# Patient Record
Sex: FEMALE | Race: OTHER | HISPANIC OR LATINO
[De-identification: names, ages, dates, MRNs, and addresses within clinical notes are randomized per-mention and may not be internally consistent; named-entity substitution may affect disease eponyms.]

---

## 2017-02-27 ENCOUNTER — APPOINTMENT (OUTPATIENT)
Dept: OBGYN | Facility: CLINIC | Age: 37
End: 2017-02-27

## 2017-03-13 ENCOUNTER — MEDICATION RENEWAL (OUTPATIENT)
Age: 37
End: 2017-03-13

## 2017-10-26 ENCOUNTER — TRANSCRIPTION ENCOUNTER (OUTPATIENT)
Age: 37
End: 2017-10-26

## 2017-11-22 ENCOUNTER — APPOINTMENT (OUTPATIENT)
Dept: OBGYN | Facility: CLINIC | Age: 37
End: 2017-11-22
Payer: COMMERCIAL

## 2017-11-22 VITALS
WEIGHT: 148 LBS | SYSTOLIC BLOOD PRESSURE: 107 MMHG | DIASTOLIC BLOOD PRESSURE: 73 MMHG | HEIGHT: 67 IN | HEART RATE: 71 BPM | BODY MASS INDEX: 23.23 KG/M2

## 2017-11-22 PROCEDURE — 99395 PREV VISIT EST AGE 18-39: CPT

## 2017-11-27 LAB
CYTOLOGY CVX/VAG DOC THIN PREP: NORMAL
HPV HIGH+LOW RISK DNA PNL CVX: NOT DETECTED

## 2018-04-09 ENCOUNTER — MESSAGE (OUTPATIENT)
Age: 38
End: 2018-04-09

## 2018-10-15 ENCOUNTER — MEDICATION RENEWAL (OUTPATIENT)
Age: 38
End: 2018-10-15

## 2019-04-14 ENCOUNTER — RX RENEWAL (OUTPATIENT)
Age: 39
End: 2019-04-14

## 2019-06-03 ENCOUNTER — APPOINTMENT (OUTPATIENT)
Dept: INTERNAL MEDICINE | Facility: CLINIC | Age: 39
End: 2019-06-03

## 2019-08-04 ENCOUNTER — RX RENEWAL (OUTPATIENT)
Age: 39
End: 2019-08-04

## 2019-09-20 ENCOUNTER — NON-APPOINTMENT (OUTPATIENT)
Age: 39
End: 2019-09-20

## 2019-09-20 ENCOUNTER — APPOINTMENT (OUTPATIENT)
Dept: INTERNAL MEDICINE | Facility: CLINIC | Age: 39
End: 2019-09-20

## 2019-09-20 ENCOUNTER — APPOINTMENT (OUTPATIENT)
Dept: INTERNAL MEDICINE | Facility: CLINIC | Age: 39
End: 2019-09-20
Payer: COMMERCIAL

## 2019-09-20 VITALS
HEART RATE: 74 BPM | OXYGEN SATURATION: 99 % | HEIGHT: 68 IN | SYSTOLIC BLOOD PRESSURE: 115 MMHG | BODY MASS INDEX: 23.64 KG/M2 | DIASTOLIC BLOOD PRESSURE: 71 MMHG | WEIGHT: 156 LBS

## 2019-09-20 DIAGNOSIS — Z00.00 ENCOUNTER FOR GENERAL ADULT MEDICAL EXAMINATION W/OUT ABNORMAL FINDINGS: ICD-10-CM

## 2019-09-20 PROCEDURE — 96127 BRIEF EMOTIONAL/BEHAV ASSMT: CPT

## 2019-09-20 PROCEDURE — 99385 PREV VISIT NEW AGE 18-39: CPT | Mod: 25

## 2019-09-20 PROCEDURE — 99213 OFFICE O/P EST LOW 20 MIN: CPT | Mod: 25

## 2019-09-20 PROCEDURE — 36415 COLL VENOUS BLD VENIPUNCTURE: CPT

## 2019-09-20 PROCEDURE — 93000 ELECTROCARDIOGRAM COMPLETE: CPT

## 2019-09-20 RX ORDER — NORETHINDRONE ACETATE/ETHINYL ESTRADIOL AND FERROUS FUMARATE 1MG-20(21)
1-20 KIT ORAL
Qty: 3 | Refills: 1 | Status: DISCONTINUED | COMMUNITY
Start: 2017-11-22 | End: 2019-09-20

## 2019-09-20 RX ORDER — NORETHINDRONE ACETATE AND ETHINYL ESTRADIOL AND FERROUS FUMARATE 1MG-20(21)
1-20 KIT ORAL
Qty: 84 | Refills: 0 | Status: DISCONTINUED | COMMUNITY
Start: 2019-04-14 | End: 2019-09-20

## 2019-09-20 NOTE — ASSESSMENT
[FreeTextEntry1] : Labs drawn in office. Further recommendations based on results.\par Atypical chest pain not likely cardiac in origin. EKG normal, no chest pain on exertion. May be due to stress. Less likely reflux. \par Tdap in 2016.\par Follow up annually and prn.

## 2019-09-20 NOTE — HEALTH RISK ASSESSMENT
[Yes] : Yes [2 - 4 times a month (2 pts)] : 2-4 times a month (2 points) [1 or 2 (0 pts)] : 1 or 2 (0 points) [Never (0 pts)] : Never (0 points) [No] : In the past 12 months have you used drugs other than those required for medical reasons? No [0] : 2) Feeling down, depressed, or hopeless: Not at all (0) [HIV test declined] : HIV test declined [] : No [Audit-CScore] : 2 [[id number]] : 0

## 2019-09-20 NOTE — ADDENDUM
[FreeTextEntry1] : I, Rita Osorio, acted solely as a scribe for Dr. Cummins on this date [9/20/2019].

## 2019-09-20 NOTE — PHYSICAL EXAM
[No Acute Distress] : no acute distress [Well Nourished] : well nourished [Well Developed] : well developed [Well-Appearing] : well-appearing [Normal Sclera/Conjunctiva] : normal sclera/conjunctiva [PERRL] : pupils equal round and reactive to light [EOMI] : extraocular movements intact [Normal Outer Ear/Nose] : the outer ears and nose were normal in appearance [Normal Oropharynx] : the oropharynx was normal [No JVD] : no jugular venous distention [No Lymphadenopathy] : no lymphadenopathy [Supple] : supple [No Respiratory Distress] : no respiratory distress  [Thyroid Normal, No Nodules] : the thyroid was normal and there were no nodules present [No Accessory Muscle Use] : no accessory muscle use [Clear to Auscultation] : lungs were clear to auscultation bilaterally [Normal Rate] : normal rate  [Normal S1, S2] : normal S1 and S2 [Regular Rhythm] : with a regular rhythm [No Murmur] : no murmur heard [No Carotid Bruits] : no carotid bruits [No Varicosities] : no varicosities [No Abdominal Bruit] : a ~M bruit was not heard ~T in the abdomen [No Edema] : there was no peripheral edema [Pedal Pulses Present] : the pedal pulses are present [No Palpable Aorta] : no palpable aorta [No Extremity Clubbing/Cyanosis] : no extremity clubbing/cyanosis [Soft] : abdomen soft [Non Tender] : non-tender [Non-distended] : non-distended [No Masses] : no abdominal mass palpated [No HSM] : no HSM [Normal Posterior Cervical Nodes] : no posterior cervical lymphadenopathy [Normal Bowel Sounds] : normal bowel sounds [Normal Anterior Cervical Nodes] : no anterior cervical lymphadenopathy [No CVA Tenderness] : no CVA  tenderness [No Spinal Tenderness] : no spinal tenderness [No Joint Swelling] : no joint swelling [Grossly Normal Strength/Tone] : grossly normal strength/tone [No Rash] : no rash [Coordination Grossly Intact] : coordination grossly intact [No Focal Deficits] : no focal deficits [Normal Gait] : normal gait [Normal Insight/Judgement] : insight and judgment were intact [Normal Affect] : the affect was normal [de-identified] : by gyn

## 2019-09-20 NOTE — HISTORY OF PRESENT ILLNESS
[de-identified] : Patient presents for initial CPE. She has no significant medical problems and takes no medications. She discontinued birth control pills 2 months ago. She complains of intermittent chest pain at rest for months. States it feels like soreness in her chest as if she had exercised. She can have it every day, but sometimes goes long periods without getting it. She denies palpitations, shortness of breath and chest pain on exertion. She has been attributing it to GERD but states it feels different than GERD during her pregnancies. \par She is a [employer]. She exercises 3 times per week. She has 3 daughters, ages 7, 3 & 3. She is a never smoker, occasionally drinks alcohol, and does not use illicit drugs. 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

## 2019-09-20 NOTE — END OF VISIT
[FreeTextEntry3] : All medical record entries made by the Scribe were at my, Dr. Cummins's, direction and personally dictated by me on [9/20/2019]. I have reviewed the chart and agree that the record accurately reflects my personal performance of the history, physical exam, assessment and plan. I have also personally directed, reviewed and agreed with the chart.

## 2019-09-23 ENCOUNTER — TRANSCRIPTION ENCOUNTER (OUTPATIENT)
Age: 39
End: 2019-09-23

## 2019-09-23 LAB
ALBUMIN SERPL ELPH-MCNC: 4.8 G/DL
ALP BLD-CCNC: 56 U/L
ALT SERPL-CCNC: 14 U/L
ANION GAP SERPL CALC-SCNC: 11 MMOL/L
AST SERPL-CCNC: 17 U/L
BASOPHILS # BLD AUTO: 0.03 K/UL
BASOPHILS NFR BLD AUTO: 0.4 %
BILIRUB SERPL-MCNC: 0.5 MG/DL
BUN SERPL-MCNC: 11 MG/DL
CALCIUM SERPL-MCNC: 9.3 MG/DL
CHLORIDE SERPL-SCNC: 105 MMOL/L
CHOLEST SERPL-MCNC: 161 MG/DL
CHOLEST/HDLC SERPL: 2.7 RATIO
CO2 SERPL-SCNC: 25 MMOL/L
CREAT SERPL-MCNC: 0.68 MG/DL
EOSINOPHIL # BLD AUTO: 0.16 K/UL
EOSINOPHIL NFR BLD AUTO: 2.2 %
GLUCOSE SERPL-MCNC: 91 MG/DL
HCT VFR BLD CALC: 38 %
HDLC SERPL-MCNC: 59 MG/DL
HGB BLD-MCNC: 12.4 G/DL
IMM GRANULOCYTES NFR BLD AUTO: 0.4 %
LDLC SERPL CALC-MCNC: 89 MG/DL
LYMPHOCYTES # BLD AUTO: 1.2 K/UL
LYMPHOCYTES NFR BLD AUTO: 16.2 %
MAN DIFF?: NORMAL
MCHC RBC-ENTMCNC: 28.4 PG
MCHC RBC-ENTMCNC: 32.6 GM/DL
MCV RBC AUTO: 87 FL
MONOCYTES # BLD AUTO: 0.49 K/UL
MONOCYTES NFR BLD AUTO: 6.6 %
NEUTROPHILS # BLD AUTO: 5.5 K/UL
NEUTROPHILS NFR BLD AUTO: 74.2 %
PLATELET # BLD AUTO: 204 K/UL
POTASSIUM SERPL-SCNC: 4.7 MMOL/L
PROT SERPL-MCNC: 7.7 G/DL
RBC # BLD: 4.37 M/UL
RBC # FLD: 13.1 %
SODIUM SERPL-SCNC: 141 MMOL/L
TRIGL SERPL-MCNC: 65 MG/DL
TSH SERPL-ACNC: 1.23 UIU/ML
WBC # FLD AUTO: 7.41 K/UL

## 2020-01-13 ENCOUNTER — APPOINTMENT (OUTPATIENT)
Dept: OBGYN | Facility: CLINIC | Age: 40
End: 2020-01-13

## 2020-02-27 ENCOUNTER — APPOINTMENT (OUTPATIENT)
Dept: OBGYN | Facility: CLINIC | Age: 40
End: 2020-02-27
Payer: COMMERCIAL

## 2020-02-27 ENCOUNTER — ASOB RESULT (OUTPATIENT)
Age: 40
End: 2020-02-27

## 2020-02-27 VITALS
SYSTOLIC BLOOD PRESSURE: 112 MMHG | WEIGHT: 163 LBS | HEART RATE: 73 BPM | BODY MASS INDEX: 24.71 KG/M2 | DIASTOLIC BLOOD PRESSURE: 70 MMHG | HEIGHT: 68 IN

## 2020-02-27 DIAGNOSIS — O36.80X0 PREGNANCY WITH INCONCLUSIVE FETAL VIABILITY, NOT APPLICABLE OR UNSPECIFIED: ICD-10-CM

## 2020-02-27 DIAGNOSIS — O36.80X1 PREGNANCY WITH INCONCLUSIVE FETAL VIABILITY, FETUS 1: ICD-10-CM

## 2020-02-27 DIAGNOSIS — Z30.9 ENCOUNTER FOR CONTRACEPTIVE MANAGEMENT, UNSPECIFIED: ICD-10-CM

## 2020-02-27 DIAGNOSIS — R07.89 OTHER CHEST PAIN: ICD-10-CM

## 2020-02-27 PROCEDURE — 76817 TRANSVAGINAL US OBSTETRIC: CPT

## 2020-02-27 PROCEDURE — 99214 OFFICE O/P EST MOD 30 MIN: CPT

## 2020-02-27 RX ORDER — FAMOTIDINE 20 MG
TABLET ORAL
Refills: 0 | Status: ACTIVE | COMMUNITY

## 2020-02-28 LAB
BASOPHILS # BLD AUTO: 0.03 K/UL
BASOPHILS NFR BLD AUTO: 0.3 %
EOSINOPHIL # BLD AUTO: 0.43 K/UL
EOSINOPHIL NFR BLD AUTO: 4.1 %
HCT VFR BLD CALC: 33.8 %
HGB BLD-MCNC: 11.5 G/DL
HIV1+2 AB SPEC QL IA.RAPID: NONREACTIVE
IMM GRANULOCYTES NFR BLD AUTO: 0.4 %
LYMPHOCYTES # BLD AUTO: 1.41 K/UL
LYMPHOCYTES NFR BLD AUTO: 13.4 %
MAN DIFF?: NORMAL
MCHC RBC-ENTMCNC: 28.7 PG
MCHC RBC-ENTMCNC: 34 GM/DL
MCV RBC AUTO: 84.3 FL
MONOCYTES # BLD AUTO: 0.71 K/UL
MONOCYTES NFR BLD AUTO: 6.7 %
NEUTROPHILS # BLD AUTO: 7.93 K/UL
NEUTROPHILS NFR BLD AUTO: 75.1 %
PLATELET # BLD AUTO: 220 K/UL
RBC # BLD: 4.01 M/UL
RBC # FLD: 13.2 %
T PALLIDUM AB SER QL IA: NEGATIVE
WBC # FLD AUTO: 10.55 K/UL

## 2020-03-03 LAB
ABO + RH PNL BLD: NORMAL
AR GENE MUT ANL BLD/T: NEGATIVE
B19V IGG SER QL IA: 1.9 INDEX
B19V IGG+IGM SER-IMP: NORMAL
B19V IGG+IGM SER-IMP: POSITIVE
B19V IGM FLD-ACNC: 0.1
B19V IGM SER-ACNC: NEGATIVE
BACTERIA UR CULT: NORMAL
BLD GP AB SCN SERPL QL: NORMAL
C TRACH RRNA SPEC QL NAA+PROBE: NOT DETECTED
CYTOLOGY CVX/VAG DOC THIN PREP: NORMAL
HBV SURFACE AG SER QL: NONREACTIVE
HCV AB SER QL: NONREACTIVE
HCV S/CO RATIO: 0.35 S/CO
HGB A MFR BLD: 60.2 %
HGB A2 MFR BLD: 3.5 %
HGB FRACT BLD-IMP: NORMAL
HGB S MFR BLD: 36.3 %
HPV HIGH+LOW RISK DNA PNL CVX: NOT DETECTED
LEAD BLD-MCNC: <1 UG/DL
MEV IGG FLD QL IA: 197 AU/ML
MEV IGG+IGM SER-IMP: POSITIVE
N GONORRHOEA RRNA SPEC QL NAA+PROBE: NOT DETECTED
RUBV IGG FLD-ACNC: 3.8 INDEX
RUBV IGG SER-IMP: POSITIVE
SOURCE AMPLIFICATION: NORMAL
VZV AB TITR SER: POSITIVE
VZV IGG SER IF-ACNC: 1697 INDEX

## 2020-03-10 LAB — FMR1 GENE MUT ANL BLD/T: NORMAL

## 2020-03-23 ENCOUNTER — APPOINTMENT (OUTPATIENT)
Dept: OBGYN | Facility: CLINIC | Age: 40
End: 2020-03-23

## 2020-03-30 ENCOUNTER — APPOINTMENT (OUTPATIENT)
Dept: OBGYN | Facility: CLINIC | Age: 40
End: 2020-03-30
Payer: COMMERCIAL

## 2020-03-30 VITALS
WEIGHT: 168 LBS | DIASTOLIC BLOOD PRESSURE: 78 MMHG | BODY MASS INDEX: 25.46 KG/M2 | SYSTOLIC BLOOD PRESSURE: 127 MMHG | HEIGHT: 68 IN

## 2020-03-30 DIAGNOSIS — O09.521 SUPERVISION OF ELDERLY MULTIGRAVIDA, FIRST TRIMESTER: ICD-10-CM

## 2020-03-30 PROCEDURE — 0501F PRENATAL FLOW SHEET: CPT

## 2020-04-15 ENCOUNTER — ASOB RESULT (OUTPATIENT)
Age: 40
End: 2020-04-15

## 2020-04-15 ENCOUNTER — APPOINTMENT (OUTPATIENT)
Dept: ANTEPARTUM | Facility: CLINIC | Age: 40
End: 2020-04-15
Payer: COMMERCIAL

## 2020-04-15 ENCOUNTER — LABORATORY RESULT (OUTPATIENT)
Age: 40
End: 2020-04-15

## 2020-04-15 VITALS — HEIGHT: 68 IN

## 2020-04-15 PROCEDURE — 76813 OB US NUCHAL MEAS 1 GEST: CPT

## 2020-04-15 PROCEDURE — 76801 OB US < 14 WKS SINGLE FETUS: CPT

## 2020-04-17 ENCOUNTER — APPOINTMENT (OUTPATIENT)
Dept: ANTEPARTUM | Facility: CLINIC | Age: 40
End: 2020-04-17

## 2020-05-04 ENCOUNTER — NON-APPOINTMENT (OUTPATIENT)
Age: 40
End: 2020-05-04

## 2020-05-04 ENCOUNTER — APPOINTMENT (OUTPATIENT)
Dept: OBGYN | Facility: CLINIC | Age: 40
End: 2020-05-04
Payer: COMMERCIAL

## 2020-05-04 VITALS
WEIGHT: 171 LBS | BODY MASS INDEX: 25.91 KG/M2 | DIASTOLIC BLOOD PRESSURE: 77 MMHG | HEIGHT: 68 IN | SYSTOLIC BLOOD PRESSURE: 121 MMHG

## 2020-05-04 PROBLEM — O09.521 AMA (ADVANCED MATERNAL AGE) MULTIGRAVIDA 35+, FIRST TRIMESTER: Status: ACTIVE | Noted: 2020-05-04

## 2020-05-04 PROCEDURE — 0502F SUBSEQUENT PRENATAL CARE: CPT

## 2020-05-07 LAB
2ND TRIMESTER DATA: NORMAL
AFP PNL SERPL: NORMAL
AFP SERPL-ACNC: NORMAL
CLINICAL BIOCHEMIST REVIEW: NORMAL
NOTES NTD: NORMAL

## 2020-06-08 ENCOUNTER — NON-APPOINTMENT (OUTPATIENT)
Age: 40
End: 2020-06-08

## 2020-06-08 ENCOUNTER — APPOINTMENT (OUTPATIENT)
Dept: OBGYN | Facility: CLINIC | Age: 40
End: 2020-06-08
Payer: COMMERCIAL

## 2020-06-08 VITALS
WEIGHT: 177 LBS | DIASTOLIC BLOOD PRESSURE: 73 MMHG | HEIGHT: 68 IN | SYSTOLIC BLOOD PRESSURE: 134 MMHG | BODY MASS INDEX: 26.83 KG/M2

## 2020-06-08 VITALS — TEMPERATURE: 98.1 F | DIASTOLIC BLOOD PRESSURE: 80 MMHG | SYSTOLIC BLOOD PRESSURE: 126 MMHG

## 2020-06-08 PROCEDURE — 0502F SUBSEQUENT PRENATAL CARE: CPT

## 2020-06-22 ENCOUNTER — ASOB RESULT (OUTPATIENT)
Age: 40
End: 2020-06-22

## 2020-06-22 ENCOUNTER — APPOINTMENT (OUTPATIENT)
Dept: ANTEPARTUM | Facility: CLINIC | Age: 40
End: 2020-06-22
Payer: COMMERCIAL

## 2020-06-22 PROCEDURE — 76811 OB US DETAILED SNGL FETUS: CPT

## 2020-07-06 ENCOUNTER — NON-APPOINTMENT (OUTPATIENT)
Age: 40
End: 2020-07-06

## 2020-07-06 ENCOUNTER — APPOINTMENT (OUTPATIENT)
Dept: OBGYN | Facility: CLINIC | Age: 40
End: 2020-07-06
Payer: COMMERCIAL

## 2020-07-06 VITALS
WEIGHT: 181 LBS | HEIGHT: 68 IN | BODY MASS INDEX: 27.43 KG/M2 | SYSTOLIC BLOOD PRESSURE: 124 MMHG | DIASTOLIC BLOOD PRESSURE: 76 MMHG

## 2020-07-06 DIAGNOSIS — O09.522 SUPERVISION OF ELDERLY MULTIGRAVIDA, SECOND TRIMESTER: ICD-10-CM

## 2020-07-06 PROCEDURE — 0502F SUBSEQUENT PRENATAL CARE: CPT

## 2020-07-07 LAB
BASOPHILS # BLD AUTO: 0.06 K/UL
BASOPHILS NFR BLD AUTO: 0.5 %
EOSINOPHIL # BLD AUTO: 0.06 K/UL
EOSINOPHIL NFR BLD AUTO: 0.5 %
HCT VFR BLD CALC: 34.3 %
HGB BLD-MCNC: 11.2 G/DL
IMM GRANULOCYTES NFR BLD AUTO: 2 %
LYMPHOCYTES # BLD AUTO: 0.97 K/UL
LYMPHOCYTES NFR BLD AUTO: 8.5 %
MAN DIFF?: NORMAL
MCHC RBC-ENTMCNC: 28.9 PG
MCHC RBC-ENTMCNC: 32.7 GM/DL
MCV RBC AUTO: 88.6 FL
MONOCYTES # BLD AUTO: 0.63 K/UL
MONOCYTES NFR BLD AUTO: 5.5 %
NEUTROPHILS # BLD AUTO: 9.41 K/UL
NEUTROPHILS NFR BLD AUTO: 83 %
PLATELET # BLD AUTO: 190 K/UL
RBC # BLD: 3.87 M/UL
RBC # FLD: 13 %
T PALLIDUM AB SER QL IA: NEGATIVE
WBC # FLD AUTO: 11.36 K/UL

## 2020-07-09 DIAGNOSIS — R73.09 OTHER ABNORMAL GLUCOSE: ICD-10-CM

## 2020-07-14 LAB
GLUCOSE 1H P 100 G GLC PO SERPL-MCNC: 88 MG/DL
GLUCOSE 1H P 50 G GLC PO SERPL-MCNC: 140 MG/DL
GLUCOSE 2H P CHAL SERPL-MCNC: 74 MG/DL
GLUCOSE 3H P CHAL SERPL-MCNC: 53 MG/DL
GLUCOSE BS SERPL-MCNC: 80 MG/DL

## 2020-08-03 ENCOUNTER — APPOINTMENT (OUTPATIENT)
Dept: OBGYN | Facility: CLINIC | Age: 40
End: 2020-08-03
Payer: COMMERCIAL

## 2020-08-03 ENCOUNTER — NON-APPOINTMENT (OUTPATIENT)
Age: 40
End: 2020-08-03

## 2020-08-03 VITALS
DIASTOLIC BLOOD PRESSURE: 75 MMHG | SYSTOLIC BLOOD PRESSURE: 112 MMHG | HEIGHT: 68 IN | BODY MASS INDEX: 27.58 KG/M2 | WEIGHT: 182 LBS

## 2020-08-03 PROCEDURE — 0502F SUBSEQUENT PRENATAL CARE: CPT

## 2020-08-19 ENCOUNTER — APPOINTMENT (OUTPATIENT)
Dept: OBGYN | Facility: CLINIC | Age: 40
End: 2020-08-19
Payer: COMMERCIAL

## 2020-08-19 ENCOUNTER — APPOINTMENT (OUTPATIENT)
Dept: ANTEPARTUM | Facility: CLINIC | Age: 40
End: 2020-08-19
Payer: COMMERCIAL

## 2020-08-19 ENCOUNTER — NON-APPOINTMENT (OUTPATIENT)
Age: 40
End: 2020-08-19

## 2020-08-19 ENCOUNTER — ASOB RESULT (OUTPATIENT)
Age: 40
End: 2020-08-19

## 2020-08-19 VITALS
WEIGHT: 192 LBS | SYSTOLIC BLOOD PRESSURE: 118 MMHG | HEIGHT: 68 IN | DIASTOLIC BLOOD PRESSURE: 75 MMHG | BODY MASS INDEX: 29.1 KG/M2

## 2020-08-19 PROCEDURE — 76817 TRANSVAGINAL US OBSTETRIC: CPT

## 2020-08-19 PROCEDURE — 76816 OB US FOLLOW-UP PER FETUS: CPT

## 2020-08-19 PROCEDURE — 0502F SUBSEQUENT PRENATAL CARE: CPT

## 2020-08-19 PROCEDURE — 76819 FETAL BIOPHYS PROFIL W/O NST: CPT

## 2020-09-03 ENCOUNTER — APPOINTMENT (OUTPATIENT)
Dept: OBGYN | Facility: CLINIC | Age: 40
End: 2020-09-03
Payer: COMMERCIAL

## 2020-09-03 VITALS
BODY MASS INDEX: 29.25 KG/M2 | WEIGHT: 193 LBS | DIASTOLIC BLOOD PRESSURE: 75 MMHG | HEIGHT: 68 IN | SYSTOLIC BLOOD PRESSURE: 126 MMHG

## 2020-09-03 PROCEDURE — 0502F SUBSEQUENT PRENATAL CARE: CPT

## 2020-09-11 ENCOUNTER — NON-APPOINTMENT (OUTPATIENT)
Age: 40
End: 2020-09-11

## 2020-09-16 ENCOUNTER — APPOINTMENT (OUTPATIENT)
Dept: ANTEPARTUM | Facility: CLINIC | Age: 40
End: 2020-09-16
Payer: COMMERCIAL

## 2020-09-16 ENCOUNTER — ASOB RESULT (OUTPATIENT)
Age: 40
End: 2020-09-16

## 2020-09-16 ENCOUNTER — NON-APPOINTMENT (OUTPATIENT)
Age: 40
End: 2020-09-16

## 2020-09-16 ENCOUNTER — APPOINTMENT (OUTPATIENT)
Dept: OBGYN | Facility: CLINIC | Age: 40
End: 2020-09-16
Payer: COMMERCIAL

## 2020-09-16 VITALS
WEIGHT: 196 LBS | SYSTOLIC BLOOD PRESSURE: 127 MMHG | BODY MASS INDEX: 29.7 KG/M2 | DIASTOLIC BLOOD PRESSURE: 76 MMHG | HEIGHT: 68 IN

## 2020-09-16 PROCEDURE — 76816 OB US FOLLOW-UP PER FETUS: CPT

## 2020-09-16 PROCEDURE — 76819 FETAL BIOPHYS PROFIL W/O NST: CPT

## 2020-09-16 PROCEDURE — 0502F SUBSEQUENT PRENATAL CARE: CPT

## 2020-09-21 ENCOUNTER — OUTPATIENT (OUTPATIENT)
Dept: INPATIENT UNIT | Facility: HOSPITAL | Age: 40
LOS: 1 days | Discharge: HOME | End: 2020-09-21
Payer: COMMERCIAL

## 2020-09-21 ENCOUNTER — APPOINTMENT (OUTPATIENT)
Dept: ANTEPARTUM | Facility: HOSPITAL | Age: 40
End: 2020-09-21

## 2020-09-21 VITALS — SYSTOLIC BLOOD PRESSURE: 110 MMHG | HEART RATE: 77 BPM | DIASTOLIC BLOOD PRESSURE: 76 MMHG

## 2020-09-21 VITALS — TEMPERATURE: 99 F

## 2020-09-21 DIAGNOSIS — O26.899 OTHER SPECIFIED PREGNANCY RELATED CONDITIONS, UNSPECIFIED TRIMESTER: ICD-10-CM

## 2020-09-21 DIAGNOSIS — Z3A.00 WEEKS OF GESTATION OF PREGNANCY NOT SPECIFIED: ICD-10-CM

## 2020-09-21 LAB
APPEARANCE UR: CLEAR — SIGNIFICANT CHANGE UP
BILIRUB UR-MCNC: NEGATIVE — SIGNIFICANT CHANGE UP
BLOOD UR QL VISUAL: NEGATIVE — SIGNIFICANT CHANGE UP
COLOR SPEC: SIGNIFICANT CHANGE UP
GLUCOSE UR-MCNC: NEGATIVE — SIGNIFICANT CHANGE UP
KETONES UR-MCNC: NEGATIVE — SIGNIFICANT CHANGE UP
LEUKOCYTE ESTERASE UR-ACNC: NEGATIVE — SIGNIFICANT CHANGE UP
NITRITE UR-MCNC: NEGATIVE — SIGNIFICANT CHANGE UP
PH UR: 7 — SIGNIFICANT CHANGE UP (ref 5–8)
PROT UR-MCNC: NEGATIVE — SIGNIFICANT CHANGE UP
SP GR SPEC: 1.01 — SIGNIFICANT CHANGE UP (ref 1–1.04)
UROBILINOGEN FLD QL: NORMAL — SIGNIFICANT CHANGE UP

## 2020-09-21 PROCEDURE — 76818 FETAL BIOPHYS PROFILE W/NST: CPT | Mod: 26

## 2020-09-21 PROCEDURE — 99214 OFFICE O/P EST MOD 30 MIN: CPT

## 2020-09-21 PROCEDURE — 59025 FETAL NON-STRESS TEST: CPT | Mod: 26

## 2020-09-21 NOTE — OB PROVIDER TRIAGE NOTE - NSOBPROVIDERNOTE_OBGYN_ALL_OB_FT
Quick Look @ 1007:   FH -123 bpm Quick Look @ 1007:   FH -123 bpm  _____________________________________ Quick Look @ 1007:   FH -123 bpm  _____________________________________  Dr Johnson notified of above findings  Pt cleared to be discharged home by Dr Johnson  Patient had AM appointment in the office today, pt call the office to schedule a new appointment for this week.

## 2020-09-21 NOTE — OB PROVIDER TRIAGE NOTE - HISTORY OF PRESENT ILLNESS
Patient of Dr London  41 y/o  female, para 2003 @ 36+2 wks gestation, single IUP.  Presents to triage with c/o decreased FM last night with Absent FM since 6am. Pt also with c/o pelvic pressure 1/10 on pain scale.   Pt denies any leakage of fluid or vaginal bleeding.    A/P Complications: 2 Vessel-Cord; AMA  Blood Transfusion: Patient will accept blood    Covid Assessment: + Covid-19 in /Recovered, Asymptomatic now.      Allergies: NKDA  Meds: PNV  PMH: Denies  PSH: Denies  OBgynHx    2012-Uncomplicated  @ 40wks. Female, 7.10#  2016-Uncomplicated  @ 37 wks. Twins. Female, 5.13#, Female 7.10#  Pt denies any h/o: Abn. PAP, ovarian cysts, uterine fibroids, breast problems, STDs/STIs  PSY: Denies  EtOH/ Smoke/ Recreational substance use: denies  FH: Diabetes (Father); Dementia (Mother)  H/W/BMI: 68"/195#/29.7

## 2020-09-21 NOTE — OB PROVIDER TRIAGE NOTE - ADDITIONAL INSTRUCTIONS
Follow up with Dr London for routine OB care  -Continue fetal kick counts/fetal awareness  -Increase hydration  -Return to triage for: decreased fetal movement, leakage of fluid, vaginal bleeding, increase in contraction frequency/intensity, or for any other concerns.

## 2020-09-21 NOTE — OB PROVIDER TRIAGE NOTE - PLAN OF CARE
Pt cleared to be discharged home by Dr Johnson  Pt to follow-up with Dr London   labor warning signs and FKC reviewed with patient. Pt verbalized understanding.

## 2020-09-21 NOTE — OB PROVIDER TRIAGE NOTE - NSHPPHYSICALEXAM_GEN_ALL_CORE
41 y/o  female, para 2003 @ 36+2 wks gestation, single IUP.  NST/BPP  Gen: NAD; A+O x 3  Cardiac: S1/S2, R/R/R  Pulm: CTAB  Abdomen: Gravid, soft, non-tender  VS-BP: 125/81, P71, RR 18, T 37.0, Pain 1/10   NST in progress: 140 bpm, moderate variability, no accels, no decels  [place]: q 3-5 min  SVE: , intact membranes  GBS obtained  ATU EFW on 9/16/20: 2906g  Limited TAS:  Plan:  Continue NST  UA 41 y/o  female, para 2003 @ 36+2 wks gestation, single IUP.  NST/BPP  Gen: NAD; A+O x 3  Cardiac: S1/S2, R/R/R  Pulm: CTAB  Abdomen: Gravid, soft, non-tender  VS-BP: 125/81, P71, RR 18, T 37.0, Pain 1/10   NST in progress: 140 bpm, moderate variability, no accels, no decels  [place]: q 3-5 min  SVE:1/50/-3, intact membranes  GBS obtained  ATU EFW on 9/16/20: 2906g  Limited TAS:  Plan:  Continue NST  UA 39 y/o  female, para  @ 36+2 wks gestation, single IUP.  NST/BPP  Gen: NAD; A+O x 3  Cardiac: S1/S2, R/R/R  Pulm: CTAB  Abdomen: Gravid, soft, non-tender  VS-BP: 125/81, P71, RR 18, T 37.0, Pain 1/10   Ca t 1 tracin bpm, moderate variability, + accels, no decels  [place]: q 3-5 min  SVE: 1/50/-3, intact membranes  GBS obtained and sent  ATU EFW on 20: 2906g  Limited TAS:  Plan:  Continue NST  UA 41 y/o  female, para  @ 36+2 wks gestation, single IUP.  NST/BPP  Gen: NAD; A+O x 3  Cardiac: S1/S2, R/R/R  Pulm: CTAB  Abdomen: Gravid, soft, non-tender  VS-BP: 125/81, P71, RR 18, T 37.0, Pain 1/10   Ca t 1 tracin bpm, moderate variability, + accels, no decels  [place]: q 3-5 min  SVE: 1/50/-3, intact membranes  GBS obtained and sent  ATU EFW on 20: 2906g  Limited TAS: SLIUP, Cephalic, posterior placenta, BPP 8/8, LUCERO 12.54 cm. Pt endorses strong fetal movements multiple times  Plan:  Continue NST; PO Hydration  UA  Addendum:  Pt with continuous Cat 1 tracing, [place]: q 2-4 min. Pt denies contractions. Re-examined 2 hrs since initial examined, VE unchanged.

## 2020-09-21 NOTE — OB PROVIDER TRIAGE NOTE - NS_OBGYNHISTORY_OBGYN_ALL_OB_FT
2012-Uncomplicated  @ 40wks. Female, 7.10#  2016-Uncomplicated  @ 37 wks. Twins. Female, 5.13#, Female 7.10#  Pt denies any h/o: Abn. PAP, ovarian cysts, uterine fibroids, breast problems, STDs/STIs

## 2020-09-21 NOTE — OB PROVIDER TRIAGE NOTE - NSINFANTSEX2_OBGYN_ALL_OB
Principal Discharge DX:	Abdominal pain, unspecified abdominal location  Secondary Diagnosis:	Abdominal hernia without obstruction and without gangrene, recurrence not specified, unspecified hernia type
Female

## 2020-09-21 NOTE — OB PROVIDER TRIAGE NOTE - GRAVIDA, OB PROFILE
We are committed to providing our patients with their test results in a timely manner. If you have access to [facility], you will receive your results within 5 to 7 days. If your results are normal, a member of our office may call you or you may receive a letter in the mail within 10 to 15 days of your testing. If your results have something additional to discuss, a member of our office will contact you by phone. If at any time you have questions related your results, please feel free to call our office at [phone]     3

## 2020-09-21 NOTE — OB PROVIDER TRIAGE NOTE - NSHPLABSRESULTS_GEN_ALL_CORE
Vital Signs Last 24 Hrs  T(C): 37 (21 Sep 2020 10:21), Max: 37.0 (21 Sep 2020 10:19)  T(F): 98.6 (21 Sep 2020 10:21), Max: 98.6 (21 Sep 2020 10:19)  HR: 71 (21 Sep 2020 10:21) (71 - 71)  BP: 125/81 (21 Sep 2020 10:21) (125/81 - 125/81)  BP(mean): --  RR: 16 (21 Sep 2020 10:21) (16 - 16)  SpO2: -- Urinalysis Basic - ( 21 Sep 2020 10:44 )    Color: LIGHT YELLOW / Appearance: CLEAR / S.012 / pH: 7.0  Gluc: NEGATIVE / Ketone: NEGATIVE  / Bili: NEGATIVE / Urobili: NORMAL   Blood: NEGATIVE / Protein: NEGATIVE / Nitrite: NEGATIVE   Leuk Esterase: NEGATIVE / RBC: x / WBC x   Sq Epi: x / Non Sq Epi: x / Bacteria: x      Vital Signs Last 24 Hrs  T(C): 37 (21 Sep 2020 10:21), Max: 37.0 (21 Sep 2020 10:19)  T(F): 98.6 (21 Sep 2020 10:21), Max: 98.6 (21 Sep 2020 10:19)  HR: 77 (21 Sep 2020 12:21) (60 - 77)  BP: 110/76 (21 Sep 2020 12:21) (97/63 - 125/81)  BP(mean): --  RR: 16 (21 Sep 2020 10:21) (16 - 16)  SpO2: --

## 2020-09-23 LAB
CULTURE RESULTS: SIGNIFICANT CHANGE UP
SPECIMEN SOURCE: SIGNIFICANT CHANGE UP

## 2020-09-30 ENCOUNTER — APPOINTMENT (OUTPATIENT)
Dept: OBGYN | Facility: CLINIC | Age: 40
End: 2020-09-30
Payer: COMMERCIAL

## 2020-09-30 ENCOUNTER — NON-APPOINTMENT (OUTPATIENT)
Age: 40
End: 2020-09-30

## 2020-09-30 VITALS
SYSTOLIC BLOOD PRESSURE: 138 MMHG | HEIGHT: 68 IN | WEIGHT: 199 LBS | BODY MASS INDEX: 30.16 KG/M2 | DIASTOLIC BLOOD PRESSURE: 82 MMHG

## 2020-09-30 DIAGNOSIS — O09.523 SUPERVISION OF ELDERLY MULTIGRAVIDA, THIRD TRIMESTER: ICD-10-CM

## 2020-09-30 PROCEDURE — 0502F SUBSEQUENT PRENATAL CARE: CPT

## 2020-10-06 ENCOUNTER — TRANSCRIPTION ENCOUNTER (OUTPATIENT)
Age: 40
End: 2020-10-06

## 2020-10-06 ENCOUNTER — INPATIENT (INPATIENT)
Facility: HOSPITAL | Age: 40
LOS: 0 days | Discharge: HOME | End: 2020-10-07
Attending: OBSTETRICS & GYNECOLOGY | Admitting: OBSTETRICS & GYNECOLOGY
Payer: COMMERCIAL

## 2020-10-06 VITALS — TEMPERATURE: 98 F

## 2020-10-06 DIAGNOSIS — Z3A.00 WEEKS OF GESTATION OF PREGNANCY NOT SPECIFIED: ICD-10-CM

## 2020-10-06 DIAGNOSIS — O26.899 OTHER SPECIFIED PREGNANCY RELATED CONDITIONS, UNSPECIFIED TRIMESTER: ICD-10-CM

## 2020-10-06 LAB
BASOPHILS # BLD AUTO: 0.04 K/UL — SIGNIFICANT CHANGE UP (ref 0–0.2)
BASOPHILS NFR BLD AUTO: 0.4 % — SIGNIFICANT CHANGE UP (ref 0–2)
BLD GP AB SCN SERPL QL: NEGATIVE — SIGNIFICANT CHANGE UP
EOSINOPHIL # BLD AUTO: 0.03 K/UL — SIGNIFICANT CHANGE UP (ref 0–0.5)
EOSINOPHIL NFR BLD AUTO: 0.3 % — SIGNIFICANT CHANGE UP (ref 0–6)
HCT VFR BLD CALC: 32.8 % — LOW (ref 34.5–45)
HGB BLD-MCNC: 11.4 G/DL — LOW (ref 11.5–15.5)
IMM GRANULOCYTES NFR BLD AUTO: 1.8 % — HIGH (ref 0–1.5)
LYMPHOCYTES # BLD AUTO: 1.06 K/UL — SIGNIFICANT CHANGE UP (ref 1–3.3)
LYMPHOCYTES # BLD AUTO: 9.6 % — LOW (ref 13–44)
MCHC RBC-ENTMCNC: 28.4 PG — SIGNIFICANT CHANGE UP (ref 27–34)
MCHC RBC-ENTMCNC: 34.8 % — SIGNIFICANT CHANGE UP (ref 32–36)
MCV RBC AUTO: 81.6 FL — SIGNIFICANT CHANGE UP (ref 80–100)
MONOCYTES # BLD AUTO: 0.84 K/UL — SIGNIFICANT CHANGE UP (ref 0–0.9)
MONOCYTES NFR BLD AUTO: 7.6 % — SIGNIFICANT CHANGE UP (ref 2–14)
NEUTROPHILS # BLD AUTO: 8.85 K/UL — HIGH (ref 1.8–7.4)
NEUTROPHILS NFR BLD AUTO: 80.3 % — HIGH (ref 43–77)
NRBC # FLD: 0 K/UL — SIGNIFICANT CHANGE UP (ref 0–0)
PLATELET # BLD AUTO: 172 K/UL — SIGNIFICANT CHANGE UP (ref 150–400)
PMV BLD: 13.5 FL — HIGH (ref 7–13)
RBC # BLD: 4.02 M/UL — SIGNIFICANT CHANGE UP (ref 3.8–5.2)
RBC # FLD: 12.8 % — SIGNIFICANT CHANGE UP (ref 10.3–14.5)
RH IG SCN BLD-IMP: POSITIVE — SIGNIFICANT CHANGE UP
SARS-COV-2 RNA SPEC QL NAA+PROBE: SIGNIFICANT CHANGE UP
WBC # BLD: 11.02 K/UL — HIGH (ref 3.8–10.5)
WBC # FLD AUTO: 11.02 K/UL — HIGH (ref 3.8–10.5)

## 2020-10-06 PROCEDURE — 59400 OBSTETRICAL CARE: CPT | Mod: U9,UB,GC

## 2020-10-06 RX ORDER — BENZOCAINE 10 %
1 GEL (GRAM) MUCOUS MEMBRANE EVERY 6 HOURS
Refills: 0 | Status: DISCONTINUED | OUTPATIENT
Start: 2020-10-06 | End: 2020-10-07

## 2020-10-06 RX ORDER — IBUPROFEN 200 MG
600 TABLET ORAL EVERY 6 HOURS
Refills: 0 | Status: COMPLETED | OUTPATIENT
Start: 2020-10-06 | End: 2021-09-04

## 2020-10-06 RX ORDER — SODIUM CHLORIDE 9 MG/ML
1000 INJECTION, SOLUTION INTRAVENOUS
Refills: 0 | Status: DISCONTINUED | OUTPATIENT
Start: 2020-10-06 | End: 2020-10-06

## 2020-10-06 RX ORDER — HYDROCORTISONE 1 %
1 OINTMENT (GRAM) TOPICAL EVERY 6 HOURS
Refills: 0 | Status: DISCONTINUED | OUTPATIENT
Start: 2020-10-06 | End: 2020-10-07

## 2020-10-06 RX ORDER — SODIUM CHLORIDE 9 MG/ML
1000 INJECTION, SOLUTION INTRAVENOUS ONCE
Refills: 0 | Status: DISCONTINUED | OUTPATIENT
Start: 2020-10-06 | End: 2020-10-07

## 2020-10-06 RX ORDER — TETANUS TOXOID, REDUCED DIPHTHERIA TOXOID AND ACELLULAR PERTUSSIS VACCINE, ADSORBED 5; 2.5; 8; 8; 2.5 [IU]/.5ML; [IU]/.5ML; UG/.5ML; UG/.5ML; UG/.5ML
0.5 SUSPENSION INTRAMUSCULAR ONCE
Refills: 0 | Status: DISCONTINUED | OUTPATIENT
Start: 2020-10-06 | End: 2020-10-07

## 2020-10-06 RX ORDER — LANOLIN
1 OINTMENT (GRAM) TOPICAL EVERY 6 HOURS
Refills: 0 | Status: DISCONTINUED | OUTPATIENT
Start: 2020-10-06 | End: 2020-10-07

## 2020-10-06 RX ORDER — OXYTOCIN 10 UNIT/ML
333.33 VIAL (ML) INJECTION
Qty: 20 | Refills: 0 | Status: DISCONTINUED | OUTPATIENT
Start: 2020-10-06 | End: 2020-10-06

## 2020-10-06 RX ORDER — SODIUM CHLORIDE 9 MG/ML
3 INJECTION INTRAMUSCULAR; INTRAVENOUS; SUBCUTANEOUS EVERY 8 HOURS
Refills: 0 | Status: DISCONTINUED | OUTPATIENT
Start: 2020-10-06 | End: 2020-10-07

## 2020-10-06 RX ORDER — OXYTOCIN 10 UNIT/ML
333.33 VIAL (ML) INJECTION
Qty: 20 | Refills: 0 | Status: DISCONTINUED | OUTPATIENT
Start: 2020-10-06 | End: 2020-10-07

## 2020-10-06 RX ORDER — MAGNESIUM HYDROXIDE 400 MG/1
30 TABLET, CHEWABLE ORAL
Refills: 0 | Status: DISCONTINUED | OUTPATIENT
Start: 2020-10-06 | End: 2020-10-07

## 2020-10-06 RX ORDER — DIPHENHYDRAMINE HCL 50 MG
25 CAPSULE ORAL EVERY 6 HOURS
Refills: 0 | Status: DISCONTINUED | OUTPATIENT
Start: 2020-10-06 | End: 2020-10-07

## 2020-10-06 RX ORDER — AER TRAVELER 0.5 G/1
1 SOLUTION RECTAL; TOPICAL EVERY 4 HOURS
Refills: 0 | Status: DISCONTINUED | OUTPATIENT
Start: 2020-10-06 | End: 2020-10-07

## 2020-10-06 RX ORDER — DIBUCAINE 1 %
1 OINTMENT (GRAM) RECTAL EVERY 6 HOURS
Refills: 0 | Status: DISCONTINUED | OUTPATIENT
Start: 2020-10-06 | End: 2020-10-07

## 2020-10-06 RX ORDER — SIMETHICONE 80 MG/1
80 TABLET, CHEWABLE ORAL EVERY 4 HOURS
Refills: 0 | Status: DISCONTINUED | OUTPATIENT
Start: 2020-10-06 | End: 2020-10-07

## 2020-10-06 RX ORDER — ACETAMINOPHEN 500 MG
975 TABLET ORAL
Refills: 0 | Status: DISCONTINUED | OUTPATIENT
Start: 2020-10-06 | End: 2020-10-07

## 2020-10-06 RX ORDER — OXYCODONE HYDROCHLORIDE 5 MG/1
5 TABLET ORAL
Refills: 0 | Status: DISCONTINUED | OUTPATIENT
Start: 2020-10-06 | End: 2020-10-07

## 2020-10-06 RX ORDER — OXYCODONE HYDROCHLORIDE 5 MG/1
5 TABLET ORAL ONCE
Refills: 0 | Status: DISCONTINUED | OUTPATIENT
Start: 2020-10-06 | End: 2020-10-07

## 2020-10-06 RX ORDER — KETOROLAC TROMETHAMINE 30 MG/ML
30 SYRINGE (ML) INJECTION ONCE
Refills: 0 | Status: DISCONTINUED | OUTPATIENT
Start: 2020-10-06 | End: 2020-10-06

## 2020-10-06 RX ORDER — PRAMOXINE HYDROCHLORIDE 150 MG/15G
1 AEROSOL, FOAM RECTAL EVERY 4 HOURS
Refills: 0 | Status: DISCONTINUED | OUTPATIENT
Start: 2020-10-06 | End: 2020-10-07

## 2020-10-06 RX ORDER — ACETAMINOPHEN 500 MG
3 TABLET ORAL
Qty: 0 | Refills: 0 | DISCHARGE
Start: 2020-10-06

## 2020-10-06 RX ORDER — IBUPROFEN 200 MG
1 TABLET ORAL
Qty: 0 | Refills: 0 | DISCHARGE
Start: 2020-10-06

## 2020-10-06 RX ADMIN — Medication 30 MILLIGRAM(S): at 19:02

## 2020-10-06 RX ADMIN — Medication 30 MILLIGRAM(S): at 19:54

## 2020-10-06 NOTE — DISCHARGE NOTE OB - PATIENT PORTAL LINK FT
You can access the FollowMyHealth Patient Portal offered by [facility] by registering at the following website: [email or web address]. By joining [facility]’s FollowMyHealth portal, you will also be able to view your health information using other applications (apps) compatible with our system.

## 2020-10-06 NOTE — DISCHARGE NOTE OB - CARE PROVIDERS DIRECT ADDRESSES
[email or web address]

## 2020-10-06 NOTE — OB PROVIDER TRIAGE NOTE - HISTORY OF PRESENT ILLNESS
Patient of Dr London  39 y/o  female, para 2003 @ 38+3 wks gestation, single IUP.  Presents to triage with c/o continuous leakage of clear fluid since 1:30pm and irregular ctx q 10-15 min.   Pt endorses +FM, denies any vaginal bleeding. GBS Negative    A/P Complications: 2 Vessel-Cord; AMA  Blood Transfusion: Patient will accept blood    Covid Assessment: + Covid-19 in /Recovered, Asymptomatic now.      Allergies: NKDA  Meds: PNV  PMH: Denies  PSH: Denies  OBgynHx    2012-Uncomplicated  @ 40wks. Female, 7.10#  2016-Uncomplicated  @ 37 wks. Twins. Female, 5.13#, Female 7.10#  Pt denies any h/o: Abn. PAP, ovarian cysts, uterine fibroids, breast problems, STDs/STIs  PSY: Denies  EtOH/ Smoke/ Recreational substance use: denies  FH: Diabetes (Father); Dementia (Mother)  H/W/BMI: 68"/200#/30.4

## 2020-10-06 NOTE — OB PROVIDER H&P - NSHPPHYSICALEXAM_GEN_ALL_CORE
39 y/o  female, para 2003 @ 38+3 wks gestation, single IUP.  Evidence of PROM with Cat II Tracing  Gen: NAD; A+O x 3  Cardiac: S1/S2, R/R/R  Pulm: CTAB  Abdomen: Gravid, soft, non-tender  VS-BP: 132/85 (rpt 118/60), P89, RR 18, T 36.8, Pain 4/10-Pt denies any pain meds at this time   Ca t II tracing: 160 bpm, moderate variability, no accels, variable decels  [place]: irregular  SVE: 2/60/-3, PROM/clear @ 1:30 pm  GBS Negative 9/21/20  Clinical EFW: 3200g  TAS: Cephalic

## 2020-10-06 NOTE — OB PROVIDER TRIAGE NOTE - NSHPPHYSICALEXAM_GEN_ALL_CORE
39 y/o  female, para 2003 @ 38+3 wks gestation, single IUP.  Evidence of PROM with Cat II Tracing  Gen: NAD; A+O x 3  Cardiac: S1/S2, R/R/R  Pulm: CTAB  Abdomen: Gravid, soft, non-tender  VS-BP: 132/85 (rpt 118/60), P89, RR 18, T 36.8, Pain 4/10-Pt denies any pain meds at this time   Ca t II tracing: 160 bpm, moderate variability, no accels, variable decels  [place]: irregular  SVE: 2/60/-3, PROM/clear @ 1:30 pm  GBS Negative 9/21/20  Clinical EFW: 3200g  TAS: Cephalic

## 2020-10-06 NOTE — DISCHARGE NOTE OB - MEDICATION SUMMARY - MEDICATIONS TO TAKE
I will START or STAY ON the medications listed below when I get home from the hospital:    acetaminophen 325 mg oral tablet  -- 3 tab(s) by mouth every 6 hours, As Needed  -- Indication: For Vaginal delivery    ibuprofen 600 mg oral tablet  -- 1 tab(s) by mouth every 6 hours, As Needed  -- Indication: For Vaginal delivery

## 2020-10-06 NOTE — DISCHARGE NOTE OB - MATERIALS PROVIDED
Discharge Medication Information for Patients and Families Pocket Guide/[facility] Hearing Screen Program/Shaken Baby Prevention Handout/Breastfeeding Guide and Packet/Breastfeeding Log/Breastfeeding Mother’s Support Group Information/Back To Sleep Handout/Letter of Medical Neccessity/Tdap Vaccination (VIS Pub Date: 2012)/[place]  Immunization Record/Guide to Postpartum Care/[facility] [place] Screening Program/Bottle Feeding Log/Birth Certificate Instructions/Vaccinations

## 2020-10-06 NOTE — OB RN DELIVERY SUMMARY - NS_SEPSISRSKCALC_OBGYN_ALL_OB_FT
EOS calculated successfully. EOS Risk Factor: 0.5/1000 live births ([facility] national incidence); GA=38w3d; Temp=98.42; ROM=5.383; GBS='Negative'; Antibiotics='No antibiotics or any antibiotics < 2 hrs prior to birth'

## 2020-10-06 NOTE — DISCHARGE NOTE OB - CARE PLAN
Principal Discharge DX:	Vaginal delivery  Goal:	Recovery  Assessment and plan of treatment:	Nothing per vagina and no heavy lifting for 6 weeks

## 2020-10-06 NOTE — DISCHARGE NOTE OB - HOSPITAL COURSE
Pt admitted with PROM at term.  Spontaneously delivered a viable female infant via .  Uncomplicated PP course.

## 2020-10-06 NOTE — OB PROVIDER TRIAGE NOTE - NSHPLABSRESULTS_GEN_ALL_CORE
Vital Signs Last 24 Hrs  T(C): 36.8 (06 Oct 2020 15:49), Max: 36.8 (06 Oct 2020 15:44)  T(F): 98.2 (06 Oct 2020 15:49), Max: 98.24 (06 Oct 2020 15:44)  HR: 74 (06 Oct 2020 16:12) (74 - 89)  BP: 118/60 (06 Oct 2020 16:12) (118/60 - 132/85)  BP(mean): --  RR: 20 (06 Oct 2020 15:49) (20 - 20)  SpO2: --

## 2020-10-06 NOTE — OB PROVIDER H&P - HISTORY OF PRESENT ILLNESS
C3[facility] Patient of Dr London  41 y/o  female, para 2003 @ 38+3 wks gestation, single IUP.  Presents to triage with c/o continuous leakage of clear fluid since 1:30pm and irregular ctx q 10-15 min.   Pt endorses +FM, denies any vaginal bleeding. GBS Negative    A/P Complications: 2 Vessel-Cord; AMA  Blood Transfusion: Patient will accept blood    Covid Assessment: + Covid-19 in /Recovered, Asymptomatic now.      Allergies: NKDA  Meds: PNV  PMH: Denies  PSH: Denies  OBgynHx    2012-Uncomplicated  @ 40wks. Female, 7.10#  2016-Uncomplicated  @ 37 wks. Twins. Female, 5.13#, Female 7.10#  Pt denies any h/o: Abn. PAP, ovarian cysts, uterine fibroids, breast problems, STDs/STIs  PSY: Denies  EtOH/ Smoke/ Recreational substance use: denies  FH: Diabetes (Father); Dementia (Mother)  H/W/BMI: 68"/200#/30.4

## 2020-10-06 NOTE — DISCHARGE NOTE OB - AVOID SEXUAL ACTIVITY UNTIL YOUR POSTPARTUM VISIT
"Artie Cox (88 y.o. Female)     Date of Birth Social Security Number Address Home Phone MRN    04/26/1929  [address] [address] [phone] [id number]    Congregation Marital Status          [employer] Single       Admission Date Admission Type Admitting Provider Attending Provider Department, Room/Bed    11/1/17 Emergency Elvin Tello MD Ahmed, Aftab, MD [address], 632/1    Discharge Date Discharge Disposition Discharge Destination                      Attending Provider: Elvin Tello MD     Allergies:  Codeine, Latex, Other, Soma [Carisoprodol], Sulfa Antibiotics    Isolation:  Contact   Infection:  Other (11/02/17)   Code Status:  Conditional    Ht:  66\" (167.6 cm)   Wt:  143 lb (64.9 kg)    Admission Cmt:  None   Principal Problem:  None                Active Insurance as of 11/1/2017     Primary Coverage     Payor Plan Insurance Group Employer/Plan Group    MEDICARE MEDICARE A & B      Payor Plan Address Payor Plan Phone Number Effective From Effective To    [address] [phone] 4/1/1996     [place] [address]       Subscriber Name Subscriber Birth Date Member ID       ARTIE COX 4/26/1929 [id number]           Secondary Coverage     Payor Plan Insurance Group Employer/Plan Group    [place]      Payor Plan Address Payor Plan Phone Number Effective From Effective To    [address] [phone] 10/8/2016     [place] [address]       Subscriber Name Subscriber Birth Date Member ID       ARTIE COX 4/26/1929 [id number]                 Emergency Contacts      (Rel.) Home Phone Work Phone Mobile Phone    Byron Licona (Son) [phone] -- [phone]    Bereket Licona (Son) [phone] -- --              "
Statement Selected

## 2020-10-06 NOTE — DISCHARGE NOTE OB - CARE PROVIDER_API CALL
Sydnee London J  OBSTETRICS AND GYNECOLOGY  [address] [place] [address]  Phone: [phone]  Fax: [phone]  Follow Up Time:

## 2020-10-07 VITALS
SYSTOLIC BLOOD PRESSURE: 118 MMHG | TEMPERATURE: 98 F | HEART RATE: 70 BPM | RESPIRATION RATE: 18 BRPM | DIASTOLIC BLOOD PRESSURE: 62 MMHG | OXYGEN SATURATION: 100 %

## 2020-10-07 LAB — T PALLIDUM AB TITR SER: NEGATIVE — SIGNIFICANT CHANGE UP

## 2020-10-07 RX ORDER — IBUPROFEN 200 MG
600 TABLET ORAL EVERY 6 HOURS
Refills: 0 | Status: DISCONTINUED | OUTPATIENT
Start: 2020-10-07 | End: 2020-10-07

## 2020-10-07 RX ADMIN — SODIUM CHLORIDE 3 MILLILITER(S): 9 INJECTION INTRAMUSCULAR; INTRAVENOUS; SUBCUTANEOUS at 06:38

## 2020-10-07 RX ADMIN — Medication 600 MILLIGRAM(S): at 18:15

## 2020-10-07 RX ADMIN — Medication 600 MILLIGRAM(S): at 07:00

## 2020-10-07 RX ADMIN — Medication 600 MILLIGRAM(S): at 11:56

## 2020-10-07 RX ADMIN — Medication 600 MILLIGRAM(S): at 12:30

## 2020-10-07 RX ADMIN — Medication 600 MILLIGRAM(S): at 06:19

## 2020-10-07 RX ADMIN — Medication 600 MILLIGRAM(S): at 18:50

## 2020-10-07 NOTE — PROGRESS NOTE ADULT - SUBJECTIVE AND OBJECTIVE BOX
Attending Postpartum Note    Pateint offers no complaints   Ambulating  Denies lightheadedness and dizziness  Tolerating regular diet  Voiding    T(C): 36.7 (10-07-20 @ 05:32), Max: 37 (10-06-20 @ 21:30)  HR: 65 (10-07-20 @ 05:32) (65 - 104)  BP: 114/72 (10-07-20 @ 05:32) (114/72 - 141/69)  RR: 17 (10-07-20 @ 05:32) (16 - 20)  SpO2: 98% (10-07-20 @ 05:32) (97% - 100%)    Gen: NAD, A&O x 3  Breast: B/l symmetrical with no abnormalities, non engorged  Fundus: Firm  VE: mild lochia  Ext: LE b/l no edema, no calf tenderness               11.4   11.02 )-----------( 172      ( 10-06 @ 17:05 )             32.8         Blood Type: O Positive      Assessment: 39yo s/p NVD  Stable  Requesting PM discharge      Plan  -Analgesia prn  -Continue ambulating regular diet  -DVT prophylaxis SCDs in bed  Anticipate discharge home in PM    KADI Valdovinos MD [facility]

## 2020-10-14 ENCOUNTER — APPOINTMENT (OUTPATIENT)
Dept: OBGYN | Facility: CLINIC | Age: 40
End: 2020-10-14

## 2020-10-21 ENCOUNTER — APPOINTMENT (OUTPATIENT)
Dept: OBGYN | Facility: CLINIC | Age: 40
End: 2020-10-21

## 2021-04-19 ENCOUNTER — APPOINTMENT (OUTPATIENT)
Dept: OBGYN | Facility: CLINIC | Age: 41
End: 2021-04-19
Payer: COMMERCIAL

## 2021-04-19 VITALS
WEIGHT: 170.7 LBS | HEIGHT: 68 IN | TEMPERATURE: 97.3 F | HEART RATE: 85 BPM | BODY MASS INDEX: 25.87 KG/M2 | DIASTOLIC BLOOD PRESSURE: 74 MMHG | SYSTOLIC BLOOD PRESSURE: 118 MMHG

## 2021-04-19 DIAGNOSIS — Z01.419 ENCOUNTER FOR GYNECOLOGICAL EXAMINATION (GENERAL) (ROUTINE) W/OUT ABNORMAL FINDINGS: ICD-10-CM

## 2021-04-19 PROCEDURE — 99395 PREV VISIT EST AGE 18-39: CPT

## 2022-06-10 ENCOUNTER — TRANSCRIPTION ENCOUNTER (OUTPATIENT)
Age: 42
End: 2022-06-10

## 2022-06-11 ENCOUNTER — APPOINTMENT (OUTPATIENT)
Dept: OBGYN | Facility: CLINIC | Age: 42
End: 2022-06-11

## 2023-07-27 NOTE — OB PROVIDER DELIVERY SUMMARY - NSLACERATCATEGORY_OBGYN_ALL_OB
First Degree Niacinamide Pregnancy And Lactation Text: These medications are considered safe during pregnancy.

## 2024-05-13 NOTE — OB PROVIDER H&P - NO SIGNIFICANT PAST SURGICAL HISTORY
Is This A New Presentation, Or A Follow-Up?: Discoloration <<----- Click to add NO significant Past Surgical History
